# Patient Record
Sex: MALE | Race: WHITE | NOT HISPANIC OR LATINO | Employment: FULL TIME | URBAN - METROPOLITAN AREA
[De-identification: names, ages, dates, MRNs, and addresses within clinical notes are randomized per-mention and may not be internally consistent; named-entity substitution may affect disease eponyms.]

---

## 2017-04-17 ENCOUNTER — OFFICE VISIT (OUTPATIENT)
Dept: URGENT CARE | Facility: PHYSICIAN GROUP | Age: 32
End: 2017-04-17
Payer: OTHER GOVERNMENT

## 2017-04-17 VITALS
BODY MASS INDEX: 20.4 KG/M2 | DIASTOLIC BLOOD PRESSURE: 70 MMHG | HEIGHT: 67 IN | OXYGEN SATURATION: 98 % | TEMPERATURE: 97.4 F | WEIGHT: 130 LBS | SYSTOLIC BLOOD PRESSURE: 118 MMHG | RESPIRATION RATE: 14 BRPM | HEART RATE: 94 BPM

## 2017-04-17 DIAGNOSIS — M62.830 SPASM OF BACK MUSCLES: ICD-10-CM

## 2017-04-17 DIAGNOSIS — M54.50 ACUTE RIGHT-SIDED LOW BACK PAIN WITHOUT SCIATICA: ICD-10-CM

## 2017-04-17 PROCEDURE — 99214 OFFICE O/P EST MOD 30 MIN: CPT | Performed by: EMERGENCY MEDICINE

## 2017-04-17 RX ORDER — CYCLOBENZAPRINE HCL 5 MG
5-10 TABLET ORAL 3 TIMES DAILY PRN
Qty: 12 TAB | Refills: 0 | Status: SHIPPED | OUTPATIENT
Start: 2017-04-17

## 2017-04-17 RX ORDER — NAPROXEN 500 MG/1
500 TABLET ORAL 2 TIMES DAILY WITH MEALS
Qty: 10 TAB | Refills: 0 | Status: SHIPPED | OUTPATIENT
Start: 2017-04-17

## 2017-04-17 ASSESSMENT — ENCOUNTER SYMPTOMS
BOWEL INCONTINENCE: 0
PARESIS: 0
WEAKNESS: 0
BACK PAIN: 1
FEVER: 0
FOCAL WEAKNESS: 0
PARESTHESIAS: 0
LEG PAIN: 0
SENSORY CHANGE: 0
TINGLING: 0
NUMBNESS: 0
ABDOMINAL PAIN: 0

## 2017-04-17 ASSESSMENT — PAIN SCALES - GENERAL: PAINLEVEL: 4=SLIGHT-MODERATE PAIN

## 2017-04-17 NOTE — Clinical Note
April 17, 2017       Patient: Kavon Ayala   YOB: 1985   Date of Visit: 4/17/2017         To Whom It May Concern:    It is my medical opinion that Kavon Ayala should not have work activity involving bending, stooping, torso twisting, lifting more than 10 pounds, push/pull more than 10 pounds for 3 days.          Sincerely,          Margarito Cutler M.D.  Electronically Signed

## 2017-04-17 NOTE — PATIENT INSTRUCTIONS
You should contact a primary care provider for follow-up if not resolving in 5-7 days.     Muscle Cramps and Spasms  Muscle cramps and spasms are when muscles tighten by themselves. They usually get better within minutes. Muscle cramps are painful. They are usually stronger and last longer than muscle spasms. Muscle spasms may or may not be painful. They can last a few seconds or much longer.  HOME CARE  · Drink enough fluid to keep your pee (urine) clear or pale yellow.  · Massage, stretch, and relax the muscle.  · Use a warm towel, heating pad, or warm shower water on tight muscles.  · Place ice on the muscle if it is tender or in pain.  ¨ Put ice in a plastic bag.  ¨ Place a towel between your skin and the bag.  ¨ Leave the ice on for 15-20 minutes, 03-04 times a day.  · Only take medicine as told by your doctor.  GET HELP RIGHT AWAY IF:   Your cramps or spasms get worse, happen more often, or do not get better with time.  MAKE SURE YOU:  · Understand these instructions.  · Will watch your condition.  · Will get help right away if you are not doing well or get worse.     This information is not intended to replace advice given to you by your health care provider. Make sure you discuss any questions you have with your health care provider.     Document Released: 11/30/2009 Document Revised: 04/14/2014 Document Reviewed: 12/04/2013  [employer] Interactive Patient Education ©2016 [employer]  Back Injury Prevention  Back injuries can be very painful. They can also be difficult to heal. After having one back injury, you are more likely to injure your back again. It is important to learn how to avoid injuring or re-injuring your back. The following tips can help you to prevent a back injury.  WHAT SHOULD I KNOW ABOUT PHYSICAL FITNESS?  · Exercise for 30 minutes per day on most days of the week or as told by your doctor. Make sure to:  ¨ Do aerobic exercises, such as walking, jogging, biking, or swimming.  ¨ Do  "exercises that increase balance and strength, such as savanna chi and yoga.  ¨ Do stretching exercises. This helps with flexibility.  ¨ Try to develop strong belly (abdominal) muscles. Your belly muscles help to support your back.  · Stay at a healthy weight. This helps to decrease your risk of a back injury.  WHAT SHOULD I KNOW ABOUT MY DIET?  · Talk with your doctor about your overall diet. Take supplements and vitamins only as told by your doctor.  · Talk with your doctor about how much calcium and vitamin D you need each day. These nutrients help to prevent weakening of the bones (osteoporosis).  · Include good sources of calcium in your diet, such as:  · Dairy products.  · Green leafy vegetables.  · Products that have had calcium added to them (fortified).  · Include good sources of vitamin D in your diet, such as:  · Milk.  · Foods that have had vitamin D added to them.  WHAT SHOULD I KNOW ABOUT MY POSTURE?  · Sit up straight and stand up straight. Avoid leaning forward when you sit or hunching over when you stand.  · Choose chairs that have good low-back (lumbar) support.  · If you work at a desk, sit close to it so you do not need to lean over. Keep your chin tucked in. Keep your neck drawn back. Keep your elbows bent so your arms look like the letter \"L\" (right angle).  · Sit high and close to the steering wheel when you drive. Add a low-back support to your car seat, if needed.  · Avoid sitting or standing in one position for very long. Take breaks to get up, stretch, and walk around at least one time every hour. Take breaks every hour if you are driving for long periods of time.  · Sleep on your side with your knees slightly bent, or sleep on your back with a pillow under your knees. Do not lie on the front of your body to sleep.  WHAT SHOULD I KNOW ABOUT LIFTING, TWISTING, AND REACHING  Lifting and Heavy Lifting   · Avoid heavy lifting, especially lifting over and over again. If you must do heavy " lifting:  ¨ Stretch before lifting.  ¨ Work slowly.  ¨ Rest between lifts.  ¨ Use a tool such as a cart or a karli to move objects if one is available.  ¨ Make several small trips instead of carrying one heavy load.  ¨ Ask for help when you need it, especially when moving big objects.  · Follow these steps when lifting:  ¨ Stand with your feet shoulder-width apart.  ¨ Get as close to the object as you can. Do not  a heavy object that is far from your body.  ¨ Use handles or lifting straps if they are available.  ¨ Bend at your knees. Squat down, but keep your heels off the floor.  ¨ Keep your shoulders back. Keep your chin tucked in. Keep your back straight.  ¨ Lift the object slowly while you tighten the muscles in your legs, belly, and butt. Keep the object as close to the center of your body as possible.  · Follow these steps when putting down a heavy load:  ¨ Stand with your feet shoulder-width apart.  ¨ Lower the object slowly while you tighten the muscles in your legs, belly, and butt. Keep the object as close to the center of your body as possible.  ¨ Keep your shoulders back. Keep your chin tucked in. Keep your back straight.  ¨ Bend at your knees. Squat down, but keep your heels off the floor.  ¨ Use handles or lifting straps if they are available.  Twisting and Reaching   · Avoid lifting heavy objects above your waist.  · Do not twist at your waist while you are lifting or carrying a load. If you need to turn, move your feet.  · Do not bend over without bending at your knees.  · Avoid reaching over your head, across a table, or for an object on a high surface.    WHAT ARE SOME OTHER TIPS?  · Avoid wet floors and icy ground. Keep sidewalks clear of ice to prevent falls.    · Do not sleep on a mattress that is too soft or too hard.    · Keep items that you use often within easy reach.    · Put heavier objects on shelves at waist level, and put lighter objects on lower or higher shelves.  · Find ways  to lower your stress, such as:  ¨ Exercise.  ¨ Massage.  ¨ Relaxation techniques.  · Talk with your doctor if you feel anxious or depressed. These conditions can make back pain worse.  · Wear flat heel shoes with cushioned soles.  · Avoid making quick (sudden) movements.  · Use both shoulder straps when carrying a backpack.  · Do not use any tobacco products, including cigarettes, chewing tobacco, or electronic cigarettes. If you need help quitting, ask your doctor.     This information is not intended to replace advice given to you by your health care provider. Make sure you discuss any questions you have with your health care provider.     Document Released: 06/05/2009 Document Revised: 05/03/2016 Document Reviewed: 12/22/2015  [employer] Interactive Patient Education ©2016 [employer]  Back Pain, Adult  Back pain is very common. The pain often gets better over time. The cause of back pain is usually not dangerous. Most people can learn to manage their back pain on their own.   HOME CARE   Watch your back pain for any changes. The following actions may help to lessen any pain you are feeling:  · Stay active. Start with short walks on flat ground if you can. Try to walk farther each day.  · Exercise regularly as told by your doctor. Exercise helps your back heal faster. It also helps avoid future injury by keeping your muscles strong and flexible.  · Do not sit, drive, or  one place for more than 30 minutes.  · Do not stay in bed. Resting more than 1-2 days can slow down your recovery.  · Be careful when you bend or lift an object. Use good form when lifting:  ¨ Bend at your knees.  ¨ Keep the object close to your body.  ¨ Do not twist.  · Sleep on a firm mattress. Lie on your side, and bend your knees. If you lie on your back, put a pillow under your knees.  · Take medicines only as told by your doctor.  · Put ice on the injured area.  ¨ Put ice in a plastic bag.  ¨ Place a towel between your skin and the  bag.  ¨ Leave the ice on for 20 minutes, 2-3 times a day for the first 2-3 days. After that, you can switch between ice and heat packs.  · Avoid feeling anxious or stressed. Find good ways to deal with stress, such as exercise.  · Maintain a healthy weight. Extra weight puts stress on your back.  GET HELP IF:   · You have pain that does not go away with rest or medicine.  · You have worsening pain that goes down into your legs or buttocks.  · You have pain that does not get better in one week.  · You have pain at night.  · You lose weight.  · You have a fever or chills.  GET HELP RIGHT AWAY IF:   · You cannot control when you poop (bowel movement) or pee (urinate).  · Your arms or legs feel weak.  · Your arms or legs lose feeling (numbness).  · You feel sick to your stomach (nauseous) or throw up (vomit).  · You have belly (abdominal) pain.  · You feel like you may pass out (faint).     This information is not intended to replace advice given to you by your health care provider. Make sure you discuss any questions you have with your health care provider.     Document Released: 06/05/2009 Document Revised: 01/08/2016 Document Reviewed: 04/21/2015  [employer] Interactive Patient Education ©2016 [employer]

## 2017-04-17 NOTE — PROGRESS NOTES
"Subjective:      Kavon Ayala is a 32 y.o. male who presents with Pain            Back Pain  This is a new problem. Episode onset: 2 days. The problem occurs constantly. The problem has been gradually worsening since onset. The pain is present in the lumbar spine. The quality of the pain is described as aching. Radiates to: right buttock. The symptoms are aggravated by bending and twisting. Pertinent negatives include no abdominal pain, bladder incontinence, bowel incontinence, fever, leg pain, numbness, paresis, paresthesias, tingling or weakness. He has tried NSAIDs for the symptoms. The treatment provided mild relief.    no specific known trauma, but notes postural related issues with activity recently.    Review of Systems   Constitutional: Negative for fever.   Gastrointestinal: Negative for abdominal pain and bowel incontinence.   Genitourinary: Negative for bladder incontinence.   Musculoskeletal: Positive for back pain.   Neurological: Negative for tingling, sensory change, focal weakness, weakness, numbness and paresthesias.          Objective:     /70 mmHg  Pulse 94  Temp(Src) 36.3 °C (97.4 °F)  Resp 14  Ht 1.702 m (5' 7.01\")  Wt 58.968 kg (130 lb)  BMI 20.36 kg/m2  SpO2 98%     Physical Exam   Constitutional: Vital signs are normal. He appears well-developed and well-nourished. He is cooperative. He does not have a sickly appearance. He does not appear ill. No distress.   Cardiovascular:   Capillary refill is normal.   Musculoskeletal:        Lumbar back: He exhibits decreased range of motion, tenderness and spasm. He exhibits no bony tenderness, no swelling, no edema and no deformity.        Back:    Neurological: He is alert. He displays no tremor. Coordination normal.   Reflex Scores:       Patellar reflexes are 2+ on the right side and 2+ on the left side.       Achilles reflexes are 2+ on the right side and 2+ on the left side.  Distal sensation to light touch and pressure intact. "   Skin: Skin is warm and dry. No rash noted.   Psychiatric: He has a normal mood and affect.               Assessment/Plan:     1. Acute right-sided low back pain without sciatica  - naproxen (NAPROSYN) 500 MG Tab; Take 1 Tab by mouth 2 times a day, with meals.  Dispense: 10 Tab; Refill: 0    2. Spasm of back muscles  - cyclobenzaprine (FLEXERIL) 5 MG tablet; Take 1-2 Tabs by mouth 3 times a day as needed.  Dispense: 12 Tab; Refill: 0

## 2017-04-17 NOTE — MR AVS SNAPSHOT
"        Kavon Ayala   2017 10:45 AM   Office Visit   MRN: [id number]    Department:  [facility]   Dept Phone:  [phone]    Description:  Male : 1985   Provider:  Margarito Cutler M.D.           Reason for Visit     Pain lower right side back pain x 2 days      Allergies as of 2017     No Known Allergies      You were diagnosed with     Acute right-sided low back pain without sciatica   [2779465]       Spasm of back muscles   [[id number]]         Vital Signs     Blood Pressure Pulse Temperature Respirations Height Weight    118/70 mmHg 94 36.3 °C (97.4 °F) 14 1.702 m (5' 7.01\") 58.968 kg (130 lb)    Body Mass Index Oxygen Saturation Smoking Status             20.36 kg/m2 98% Never Smoker          Basic Information     Date Of Birth Sex Race Ethnicity Preferred Language    1985 Male White Unknown English      Health Maintenance        Date Due Completion Dates    IMM DTaP/Tdap/Td Vaccine (1 - Tdap) 2005 ---            Current Immunizations     No immunizations on file.      Below and/or attached are the medications your provider expects you to take. Review all of your home medications and newly ordered medications with your provider and/or pharmacist. Follow medication instructions as directed by your provider and/or pharmacist. Please keep your medication list with you and share with your provider. Update the information when medications are discontinued, doses are changed, or new medications (including over-the-counter products) are added; and carry medication information at all times in the event of emergency situations     Allergies:  No Known Allergies          Medications  Valid as of: 2017 - 11:23 AM    Generic Name Brand Name Tablet Size Instructions for use    Acyclovir (Tab) ZOVIRAX 800 MG Take 800 mg by mouth 2 times a day.        Amoxicillin (Cap) AMOXIL 500 MG Take 1 Cap by mouth 3 times a day.        Amphetamine-Dextroamphetamine (Tab) ADDERALL 20 MG Take 20 mg by " mouth 2 times a day.        Azithromycin (Tab) ZITHROMAX 250 MG Use YANA as directed        Cyclobenzaprine HCl (Tab) FLEXERIL 5 MG Take 1-2 Tabs by mouth 3 times a day as needed.        MethylPREDNISolone (Kit) MEDROL DOSEPACK 4 MG Take  by mouth. follow package directions        Naproxen (Tab) NAPROSYN 500 MG Take 1 Tab by mouth 2 times a day, with meals.        Triamcinolone Acetonide (Aerosol) NASACORT 55 MCG/ACT Hubbard 2 Sprays in nose every day.        .                 Medicines prescribed today were sent to:     [id number] - [place], [place] - [address]    [address] [place] [address]    Phone: [phone] Fax: [phone]    Open 24 Hours?: No      Medication refill instructions:       If your prescription bottle indicates you have medication refills left, it is not necessary to call your provider’s office. Please contact your pharmacy and they will refill your medication.    If your prescription bottle indicates you do not have any refills left, you may request refills at any time through one of the following ways: The online [employer] system (except Urgent Care), by calling your provider’s office, or by asking your pharmacy to contact your provider’s office with a refill request. Medication refills are processed only during regular business hours and may not be available until the next business day. Your provider may request additional information or to have a follow-up visit with you prior to refilling your medication.   *Please Note: Medication refills are assigned a new Rx number when refilled electronically. Your pharmacy may indicate that no refills were authorized even though a new prescription for the same medication is available at the pharmacy. Please request the medicine by name with the pharmacy before contacting your provider for a refill.        Instructions    You should contact a primary care provider for follow-up if not resolving in 5-7 days.     Muscle Cramps and  Spasms  Muscle cramps and spasms are when muscles tighten by themselves. They usually get better within minutes. Muscle cramps are painful. They are usually stronger and last longer than muscle spasms. Muscle spasms may or may not be painful. They can last a few seconds or much longer.  HOME CARE  · Drink enough fluid to keep your pee (urine) clear or pale yellow.  · Massage, stretch, and relax the muscle.  · Use a warm towel, heating pad, or warm shower water on tight muscles.  · Place ice on the muscle if it is tender or in pain.  ¨ Put ice in a plastic bag.  ¨ Place a towel between your skin and the bag.  ¨ Leave the ice on for 15-20 minutes, 03-04 times a day.  · Only take medicine as told by your doctor.  GET HELP RIGHT AWAY IF:   Your cramps or spasms get worse, happen more often, or do not get better with time.  MAKE SURE YOU:  · Understand these instructions.  · Will watch your condition.  · Will get help right away if you are not doing well or get worse.     This information is not intended to replace advice given to you by your health care provider. Make sure you discuss any questions you have with your health care provider.     Document Released: 11/30/2009 Document Revised: 04/14/2014 Document Reviewed: 12/04/2013  [employer] Interactive Patient Education ©2016 [employer]  Back Injury Prevention  Back injuries can be very painful. They can also be difficult to heal. After having one back injury, you are more likely to injure your back again. It is important to learn how to avoid injuring or re-injuring your back. The following tips can help you to prevent a back injury.  WHAT SHOULD I KNOW ABOUT PHYSICAL FITNESS?  · Exercise for 30 minutes per day on most days of the week or as told by your doctor. Make sure to:  ¨ Do aerobic exercises, such as walking, jogging, biking, or swimming.  ¨ Do exercises that increase balance and strength, such as savanna chi and yoga.  ¨ Do stretching exercises. This helps with  "flexibility.  ¨ Try to develop strong belly (abdominal) muscles. Your belly muscles help to support your back.  · Stay at a healthy weight. This helps to decrease your risk of a back injury.  WHAT SHOULD I KNOW ABOUT MY DIET?  · Talk with your doctor about your overall diet. Take supplements and vitamins only as told by your doctor.  · Talk with your doctor about how much calcium and vitamin D you need each day. These nutrients help to prevent weakening of the bones (osteoporosis).  · Include good sources of calcium in your diet, such as:  · Dairy products.  · Green leafy vegetables.  · Products that have had calcium added to them (fortified).  · Include good sources of vitamin D in your diet, such as:  · Milk.  · Foods that have had vitamin D added to them.  WHAT SHOULD I KNOW ABOUT MY POSTURE?  · Sit up straight and stand up straight. Avoid leaning forward when you sit or hunching over when you stand.  · Choose chairs that have good low-back (lumbar) support.  · If you work at a desk, sit close to it so you do not need to lean over. Keep your chin tucked in. Keep your neck drawn back. Keep your elbows bent so your arms look like the letter \"L\" (right angle).  · Sit high and close to the steering wheel when you drive. Add a low-back support to your car seat, if needed.  · Avoid sitting or standing in one position for very long. Take breaks to get up, stretch, and walk around at least one time every hour. Take breaks every hour if you are driving for long periods of time.  · Sleep on your side with your knees slightly bent, or sleep on your back with a pillow under your knees. Do not lie on the front of your body to sleep.  WHAT SHOULD I KNOW ABOUT LIFTING, TWISTING, AND REACHING  Lifting and Heavy Lifting   · Avoid heavy lifting, especially lifting over and over again. If you must do heavy lifting:  ¨ Stretch before lifting.  ¨ Work slowly.  ¨ Rest between lifts.  ¨ Use a tool such as a cart or a karli to move " objects if one is available.  ¨ Make several small trips instead of carrying one heavy load.  ¨ Ask for help when you need it, especially when moving big objects.  · Follow these steps when lifting:  ¨ Stand with your feet shoulder-width apart.  ¨ Get as close to the object as you can. Do not  a heavy object that is far from your body.  ¨ Use handles or lifting straps if they are available.  ¨ Bend at your knees. Squat down, but keep your heels off the floor.  ¨ Keep your shoulders back. Keep your chin tucked in. Keep your back straight.  ¨ Lift the object slowly while you tighten the muscles in your legs, belly, and butt. Keep the object as close to the center of your body as possible.  · Follow these steps when putting down a heavy load:  ¨ Stand with your feet shoulder-width apart.  ¨ Lower the object slowly while you tighten the muscles in your legs, belly, and butt. Keep the object as close to the center of your body as possible.  ¨ Keep your shoulders back. Keep your chin tucked in. Keep your back straight.  ¨ Bend at your knees. Squat down, but keep your heels off the floor.  ¨ Use handles or lifting straps if they are available.  Twisting and Reaching   · Avoid lifting heavy objects above your waist.  · Do not twist at your waist while you are lifting or carrying a load. If you need to turn, move your feet.  · Do not bend over without bending at your knees.  · Avoid reaching over your head, across a table, or for an object on a high surface.    WHAT ARE SOME OTHER TIPS?  · Avoid wet floors and icy ground. Keep sidewalks clear of ice to prevent falls.    · Do not sleep on a mattress that is too soft or too hard.    · Keep items that you use often within easy reach.    · Put heavier objects on shelves at waist level, and put lighter objects on lower or higher shelves.  · Find ways to lower your stress, such as:  ¨ Exercise.  ¨ Massage.  ¨ Relaxation techniques.  · Talk with your doctor if you feel  anxious or depressed. These conditions can make back pain worse.  · Wear flat heel shoes with cushioned soles.  · Avoid making quick (sudden) movements.  · Use both shoulder straps when carrying a backpack.  · Do not use any tobacco products, including cigarettes, chewing tobacco, or electronic cigarettes. If you need help quitting, ask your doctor.     This information is not intended to replace advice given to you by your health care provider. Make sure you discuss any questions you have with your health care provider.     Document Released: 06/05/2009 Document Revised: 05/03/2016 Document Reviewed: 12/22/2015  [employer] Interactive Patient Education ©2016 [employer] Inc.  Back Pain, Adult  Back pain is very common. The pain often gets better over time. The cause of back pain is usually not dangerous. Most people can learn to manage their back pain on their own.   HOME CARE   Watch your back pain for any changes. The following actions may help to lessen any pain you are feeling:  · Stay active. Start with short walks on flat ground if you can. Try to walk farther each day.  · Exercise regularly as told by your doctor. Exercise helps your back heal faster. It also helps avoid future injury by keeping your muscles strong and flexible.  · Do not sit, drive, or  one place for more than 30 minutes.  · Do not stay in bed. Resting more than 1-2 days can slow down your recovery.  · Be careful when you bend or lift an object. Use good form when lifting:  ¨ Bend at your knees.  ¨ Keep the object close to your body.  ¨ Do not twist.  · Sleep on a firm mattress. Lie on your side, and bend your knees. If you lie on your back, put a pillow under your knees.  · Take medicines only as told by your doctor.  · Put ice on the injured area.  ¨ Put ice in a plastic bag.  ¨ Place a towel between your skin and the bag.  ¨ Leave the ice on for 20 minutes, 2-3 times a day for the first 2-3 days. After that, you can switch between ice  and heat packs.  · Avoid feeling anxious or stressed. Find good ways to deal with stress, such as exercise.  · Maintain a healthy weight. Extra weight puts stress on your back.  GET HELP IF:   · You have pain that does not go away with rest or medicine.  · You have worsening pain that goes down into your legs or buttocks.  · You have pain that does not get better in one week.  · You have pain at night.  · You lose weight.  · You have a fever or chills.  GET HELP RIGHT AWAY IF:   · You cannot control when you poop (bowel movement) or pee (urinate).  · Your arms or legs feel weak.  · Your arms or legs lose feeling (numbness).  · You feel sick to your stomach (nauseous) or throw up (vomit).  · You have belly (abdominal) pain.  · You feel like you may pass out (faint).     This information is not intended to replace advice given to you by your health care provider. Make sure you discuss any questions you have with your health care provider.     Document Released: 06/05/2009 Document Revised: 01/08/2016 Document Reviewed: 04/21/2015  [employer] Interactive Patient Education ©2016 Elsevier Inc.            [employer] Access Code: [email or web address]  Expires: 5/17/2017 11:23 AM    Your email address is not on file at [facility].  Email Addresses are required for you to sign up for [employer], please contact [phone] to verify your personal information and to provide your email address prior to attempting to register for [employer].    Kavon Ayala  [address] [place] [address] [place] [address]    [employer]  A secure, online tool to manage your health information     [facility]’s [employer]® is a secure, online tool that connects you to your personalized health information from the privacy of your home -- day or night - making it very easy for you to manage your healthcare. Once the activation process is completed, you can even access your medical information using the [employer] loyda, which is available for free in the Apple Loyda store or  Google Play store.     To learn more about [facility], visit [email or web address]    There are two levels of access available (as shown below):   My Chart Features  [facility] Doctor [facility]  Specialists [facility]  Urgent  Care Non-[facility] Primary Care Doctor   Email your healthcare team securely and privately 24/7 X X X    Manage appointments: schedule your next appointment; view details of past/upcoming appointments X      Request prescription refills. X      View recent personal medical records, including lab and immunizations X X X X   View health record, including health history, allergies, medications X X X X   Read reports about your outpatient visits, procedures, consult and ER notes X X X X   See your discharge summary, which is a recap of your hospital and/or ER visit that includes your diagnosis, lab results, and care plan X X  X     How to register for [facility]:  Once your e-mail address has been verified, follow the following steps to sign up for [facility].     1. Go to  [email or web address]  2. Click on the Sign Up Now box, which takes you to the New Member Sign Up page. You will need to provide the following information:  a. Enter your [facility] Access Code exactly as it appears at the top of this page. (You will not need to use this code after you’ve completed the sign-up process. If you do not sign up before the expiration date, you must request a new code.)   b. Enter your date of birth.   c. Enter your home email address.   d. Click Submit, and follow the next screen’s instructions.  3. Create a [facility] ID. This will be your [facility] login ID and cannot be changed, so think of one that is secure and easy to remember.  4. Create a [facility] password. You can change your password at any time.  5. Enter your Password Reset Question and Answer. This can be used at a later time if you forget your password.   6. Enter your e-mail address. This allows you to receive e-mail notifications when new  information is available in [facility].  7. Click Sign Up. You can now view your health information.    For assistance activating your [facility] account, call [phone]

## 2018-09-03 ENCOUNTER — OFFICE VISIT (OUTPATIENT)
Dept: URGENT CARE | Facility: PHYSICIAN GROUP | Age: 33
End: 2018-09-03
Payer: COMMERCIAL

## 2018-09-03 VITALS
HEIGHT: 67 IN | BODY MASS INDEX: 19.62 KG/M2 | SYSTOLIC BLOOD PRESSURE: 100 MMHG | DIASTOLIC BLOOD PRESSURE: 70 MMHG | TEMPERATURE: 98.5 F | WEIGHT: 125 LBS | RESPIRATION RATE: 14 BRPM

## 2018-09-03 DIAGNOSIS — J45.21 MILD INTERMITTENT ASTHMA WITH EXACERBATION: ICD-10-CM

## 2018-09-03 DIAGNOSIS — R05.9 COUGH: ICD-10-CM

## 2018-09-03 DIAGNOSIS — J01.00 ACUTE MAXILLARY SINUSITIS, RECURRENCE NOT SPECIFIED: ICD-10-CM

## 2018-09-03 PROCEDURE — 99214 OFFICE O/P EST MOD 30 MIN: CPT | Performed by: PHYSICIAN ASSISTANT

## 2018-09-03 RX ORDER — FLUTICASONE PROPIONATE 50 MCG
1 SPRAY, SUSPENSION (ML) NASAL DAILY
Qty: 16 G | Refills: 0 | Status: SHIPPED | OUTPATIENT
Start: 2018-09-03

## 2018-09-03 RX ORDER — ALBUTEROL SULFATE 90 UG/1
2 AEROSOL, METERED RESPIRATORY (INHALATION) EVERY 6 HOURS PRN
Qty: 8.5 G | Refills: 2 | Status: SHIPPED | OUTPATIENT
Start: 2018-09-03

## 2018-09-03 RX ORDER — AMOXICILLIN AND CLAVULANATE POTASSIUM 875; 125 MG/1; MG/1
1 TABLET, FILM COATED ORAL 2 TIMES DAILY
Qty: 20 TAB | Refills: 0 | Status: SHIPPED | OUTPATIENT
Start: 2018-09-03 | End: 2024-03-07

## 2018-09-03 ASSESSMENT — ENCOUNTER SYMPTOMS
SINUS PAIN: 1
FEVER: 0
COUGH: 1
DIAPHORESIS: 0
ABDOMINAL PAIN: 0
SWOLLEN GLANDS: 1
CHILLS: 0
EYE DISCHARGE: 0
HEADACHES: 1
SPUTUM PRODUCTION: 0
SINUS PRESSURE: 1
WHEEZING: 1
VOMITING: 0
SHORTNESS OF BREATH: 0
DIARRHEA: 0
EYE REDNESS: 0
NAUSEA: 0
SORE THROAT: 1

## 2018-09-03 ASSESSMENT — PAIN SCALES - GENERAL: PAINLEVEL: NO PAIN

## 2018-09-03 NOTE — PROGRESS NOTES
"Subjective:   Kavon Ayala is a 33 y.o. male who presents for Sinusitis (x 5 days)        Sinusitis   This is a new problem. The current episode started in the past 7 days. The problem has been gradually worsening since onset. There has been no fever. Associated symptoms include congestion, coughing, headaches, sinus pressure, a sore throat and swollen glands. Pertinent negatives include no chills, diaphoresis, ear pain, shortness of breath or sneezing. Past treatments include saline sprays. The treatment provided mild relief.   notes last 5d of sinus congestion, prod/wheeze cough, PMH of bronchitis and remote PMH of pneumonia.     Review of Systems   Constitutional: Negative for chills, diaphoresis and fever.   HENT: Positive for congestion, sinus pain, sinus pressure and sore throat. Negative for ear pain and sneezing.    Eyes: Negative for discharge and redness.   Respiratory: Positive for cough and wheezing. Negative for sputum production and shortness of breath.    Cardiovascular: Negative for chest pain and leg swelling.   Gastrointestinal: Negative for abdominal pain, diarrhea, nausea and vomiting.   Skin: Negative for rash.   Neurological: Positive for headaches.   Endo/Heme/Allergies: Positive for environmental allergies.     No Known Allergies   I have worn a mask for the entire encounter with this patient.    Objective:   /70   Temp 36.9 °C (98.5 °F)   Resp 14   Ht 1.702 m (5' 7\")   Wt 56.7 kg (125 lb)   BMI 19.58 kg/m²   Physical Exam   Constitutional: He is oriented to person, place, and time. He appears well-developed and well-nourished. No distress.   HENT:   Head: Normocephalic and atraumatic.   Right Ear: External ear and ear canal normal. Tympanic membrane is bulging. Tympanic membrane is not erythematous.   Left Ear: External ear and ear canal normal. Tympanic membrane is bulging. Tympanic membrane is not erythematous.   Nose: Right sinus exhibits maxillary sinus tenderness. Right " sinus exhibits no frontal sinus tenderness. Left sinus exhibits maxillary sinus tenderness. Left sinus exhibits no frontal sinus tenderness.   Mouth/Throat: Uvula is midline and mucous membranes are normal. Posterior oropharyngeal erythema ( PND) present. No oropharyngeal exudate, posterior oropharyngeal edema or tonsillar abscesses.   Eyes: Conjunctivae are normal. Right eye exhibits no discharge. Left eye exhibits no discharge. No scleral icterus.   Neck: Neck supple.   Pulmonary/Chest: Effort normal. No respiratory distress. He has no decreased breath sounds. He has no wheezes. He has no rhonchi. He has no rales.   Musculoskeletal: Normal range of motion.   Lymphadenopathy:     He has cervical adenopathy ( mild bilat).   Neurological: He is alert and oriented to person, place, and time. Coordination normal.   Skin: Skin is warm and dry. He is not diaphoretic. No pallor.   Psychiatric: He has a normal mood and affect.   Nursing note and vitals reviewed.        Assessment/Plan:   Assessment    1. Acute maxillary sinusitis, recurrence not specified  - amoxicillin-clavulanate (AUGMENTIN) 875-125 MG Tab; Take 1 Tab by mouth 2 times a day.  Dispense: 20 Tab; Refill: 0    2. Cough    3. Mild intermittent asthma with exacerbation  - albuterol 108 (90 Base) MCG/ACT Aero Soln inhalation aerosol; Inhale 2 Puffs by mouth every 6 hours as needed for Shortness of Breath.  Dispense: 8.5 g; Refill: 2  - fluticasone (FLONASE) 50 MCG/ACT nasal spray; Spray 1 Spray in nose every day.  Dispense: 16 g; Refill: 0  Supportive care is reviewed with patient/caregiver - recommend to push PO fluids and electrolytes, Nsaids/tylenol, netti pot/saline irrig, humidifier in home, flonase, ponaris, antihistamine,  take full course of Rx, take with probiotics, observe for resolution  Return to clinic with lack of resolution or progression of symptoms.      Differential diagnosis, natural history, supportive care, and indications for immediate  follow-up discussed.

## 2019-04-16 ENCOUNTER — OFFICE VISIT (OUTPATIENT)
Dept: URGENT CARE | Facility: CLINIC | Age: 34
End: 2019-04-16
Payer: COMMERCIAL

## 2019-04-16 VITALS
WEIGHT: 123 LBS | HEART RATE: 105 BPM | RESPIRATION RATE: 12 BRPM | SYSTOLIC BLOOD PRESSURE: 120 MMHG | HEIGHT: 67 IN | DIASTOLIC BLOOD PRESSURE: 90 MMHG | BODY MASS INDEX: 19.3 KG/M2 | TEMPERATURE: 98.1 F | OXYGEN SATURATION: 97 %

## 2019-04-16 DIAGNOSIS — J01.40 ACUTE PANSINUSITIS, RECURRENCE NOT SPECIFIED: ICD-10-CM

## 2019-04-16 DIAGNOSIS — B02.9 HERPES ZOSTER WITHOUT COMPLICATION: ICD-10-CM

## 2019-04-16 DIAGNOSIS — J22 LRTI (LOWER RESPIRATORY TRACT INFECTION): ICD-10-CM

## 2019-04-16 DIAGNOSIS — N35.811 OTHER URETHRAL STRICTURE, MALE, MEATAL: ICD-10-CM

## 2019-04-16 DIAGNOSIS — B86 SCABIES: ICD-10-CM

## 2019-04-16 DIAGNOSIS — Z72.0 TOBACCO USE: ICD-10-CM

## 2019-04-16 DIAGNOSIS — J98.01 BRONCHOSPASM: ICD-10-CM

## 2019-04-16 LAB
APPEARANCE UR: CLEAR
BILIRUB UR STRIP-MCNC: NORMAL MG/DL
COLOR UR AUTO: YELLOW
GLUCOSE UR STRIP.AUTO-MCNC: NORMAL MG/DL
KETONES UR STRIP.AUTO-MCNC: NORMAL MG/DL
LEUKOCYTE ESTERASE UR QL STRIP.AUTO: NORMAL
NITRITE UR QL STRIP.AUTO: NORMAL
PH UR STRIP.AUTO: 7 [PH] (ref 5–8)
PROT UR QL STRIP: NORMAL MG/DL
RBC UR QL AUTO: NORMAL
SP GR UR STRIP.AUTO: 1.02
UROBILINOGEN UR STRIP-MCNC: 0.2 MG/DL

## 2019-04-16 PROCEDURE — 81002 URINALYSIS NONAUTO W/O SCOPE: CPT | Performed by: PHYSICIAN ASSISTANT

## 2019-04-16 PROCEDURE — 99214 OFFICE O/P EST MOD 30 MIN: CPT | Performed by: PHYSICIAN ASSISTANT

## 2019-04-16 RX ORDER — DOXYCYCLINE HYCLATE 100 MG
100 TABLET ORAL 2 TIMES DAILY
Qty: 14 TAB | Refills: 0 | Status: SHIPPED | OUTPATIENT
Start: 2019-04-16 | End: 2019-04-23

## 2019-04-16 RX ORDER — PERMETHRIN 50 MG/G
CREAM TOPICAL
Qty: 30 G | Refills: 0 | Status: SHIPPED | OUTPATIENT
Start: 2019-04-16

## 2019-04-16 RX ORDER — ALBUTEROL SULFATE 90 UG/1
2 AEROSOL, METERED RESPIRATORY (INHALATION) EVERY 6 HOURS PRN
Qty: 8.5 G | Refills: 0 | Status: SHIPPED | OUTPATIENT
Start: 2019-04-16

## 2019-04-16 RX ORDER — BENZONATATE 100 MG/1
200 CAPSULE ORAL 3 TIMES DAILY PRN
Qty: 60 CAP | Refills: 0 | Status: SHIPPED | OUTPATIENT
Start: 2019-04-16

## 2019-04-16 RX ORDER — VALACYCLOVIR HYDROCHLORIDE 1 G/1
1000 TABLET, FILM COATED ORAL 2 TIMES DAILY
Qty: 20 TAB | Refills: 0 | Status: SHIPPED | OUTPATIENT
Start: 2019-04-16 | End: 2019-04-16

## 2019-04-16 RX ORDER — VALACYCLOVIR HYDROCHLORIDE 1 G/1
1000 TABLET, FILM COATED ORAL 3 TIMES DAILY
Qty: 21 TAB | Refills: 0 | Status: SHIPPED | OUTPATIENT
Start: 2019-04-16 | End: 2019-04-23

## 2019-04-16 ASSESSMENT — ENCOUNTER SYMPTOMS
PHOTOPHOBIA: 0
FEVER: 1
HEMOPTYSIS: 0
EYE REDNESS: 0
EYE DISCHARGE: 0
FLANK PAIN: 0
NAUSEA: 0
SINUS PAIN: 1
CHILLS: 0
SHORTNESS OF BREATH: 0
ABDOMINAL PAIN: 0
EYE PAIN: 0
DOUBLE VISION: 0
VOMITING: 0
BLURRED VISION: 0
COUGH: 1
DIARRHEA: 0
WHEEZING: 1
SPUTUM PRODUCTION: 1
HEADACHES: 1

## 2019-04-17 NOTE — PATIENT INSTRUCTIONS
Scabies, Adult  Introduction  Scabies is a skin condition that happens when very small insects get under the skin (infestation). This causes a rash and severe itchiness. Scabies can spread from person to person (is contagious). If you get scabies, it is common for others in your household to get scabies too.  With proper treatment, symptoms usually go away in 2-4 weeks. Scabies usually does not cause lasting problems.  What are the causes?  This condition is caused by mites (Sarcoptes scabiei, or human itch mites) that can only be seen with a microscope. The mites get into the top layer of skin and lay eggs. Scabies can spread from person to person through:  · Close contact with a person who has scabies.  · Contact with infested items, such as towels, bedding, or clothing.  What increases the risk?  This condition is more likely to develop in:  · People who live in nursing homes and other extended-care facilities.  · People who have sexual contact with a partner who has scabies.  · Young children who attend  facilities.  · People who care for others who are at increased risk for scabies.  What are the signs or symptoms?  Symptoms of this condition may include:  · Severe itchiness. This is often worse at night.  · A rash that includes tiny red bumps or blisters. The rash commonly occurs on the wrist, elbow, armpit, fingers, waist, groin, or buttocks. Bumps may form a line (burrow) in some areas.  · Skin irritation. This can include scaly patches or sores.  How is this diagnosed?  This condition is diagnosed with a physical exam. Your health care provider will look closely at your skin. In some cases, your health care provider may take a sample of your affected skin (skin scraping) and have it examined under a microscope.  How is this treated?  This condition may be treated with:  · Medicated cream or lotion that kills the mites. This is spread on the entire body and left on for several hours. Usually, one  treatment with medicated cream or lotion is enough to kill all of the mites. In severe cases, the treatment may be repeated.  · Medicated cream that relieves itching.  · Medicines that help to relieve itching.  · Medicines that kill the mites. This treatment is rarely used.  Follow these instructions at home:     Medicines  · Take or apply over-the-counter and prescription medicines as told by your health care provider.  · Apply medicated cream or lotion as told by your health care provider.  · Do not wash off the medicated cream or lotion until the necessary amount of time has passed.  Skin Care  · Avoid scratching your affected skin.  · Keep your fingernails closely trimmed to reduce injury from scratching.  · Take cool baths or apply cool washcloths to help reduce itching.  General instructions  · Clean all items that you recently had contact with, including bedding, clothing, and furniture. Do this on the same day that your treatment starts.  ¨ Use hot water when you wash items.  ¨ Place unwashable items into closed, airtight plastic bags for at least 3 days. The mites cannot live for more than 3 days away from human skin.  ¨ Vacuum furniture and mattresses that you use.  · Make sure that other people who may have been infested are examined by a health care provider. These include members of your household and anyone who may have had contact with infested items.  · Keep all follow-up visits as told by your health care provider. This is important.  Contact a health care provider if:  · You have itching that does not go away after 4 weeks of treatment.  · You continue to develop new bumps or burrows.  · You have redness, swelling, or pain in your rash area after treatment.  · You have fluid, blood, or pus coming from your rash.  This information is not intended to replace advice given to you by your health care provider. Make sure you discuss any questions you have with your health care provider.  Document  Released: 09/07/2016 Document Revised: 05/25/2017 Document Reviewed: 07/19/2016  © 2017 [employer]  Shingles  Shingles is an infection that causes a painful skin rash and fluid-filled blisters. Shingles is caused by the same virus that causes chickenpox.  Shingles only develops in people who:  · Have had chickenpox.  · Have gotten the chickenpox vaccine. (This is rare.)  The first symptoms of shingles may be itching, tingling, or pain in an area on your skin. A rash will follow in a few days or weeks. The rash is usually on one side of the body in a bandlike or beltlike pattern. Over time, the rash turns into fluid-filled blisters that break open, scab over, and dry up. Medicines may:  · Help you manage pain.  · Help you recover more quickly.  · Help to prevent long-term problems.  Follow these instructions at home:  Medicines  · Take medicines only as told by your doctor.  · Apply an anti-itch or numbing cream to the affected area as told by your doctor.  Blister and Rash Care  · Take a cool bath or put cool compresses on the area of the rash or blisters as told by your doctor. This may help with pain and itching.  · Keep your rash covered with a loose bandage (dressing). Wear loose-fitting clothing.  · Keep your rash and blisters clean with mild soap and cool water or as told by your doctor.  · Check your rash every day for signs of infection. These include redness, swelling, and pain that lasts or gets worse.  · Do not pick your blisters.  · Do not scratch your rash.  General instructions  · Rest as told by your doctor.  · Keep all follow-up visits as told by your doctor. This is important.  · Until your blisters scab over, your infection can cause chickenpox in people who have never had it or been vaccinated against it. To prevent this from happening, avoid touching other people or being around other people, especially:  ¨ Babies.  ¨ Pregnant women.  ¨ Children who have eczema.  ¨ Elderly people who have  transplants.  ¨ People who have chronic illnesses, such as leukemia or AIDS.  Contact a doctor if:  · Your pain does not get better with medicine.  · Your pain does not get better after the rash heals.  · Your rash looks infected. Signs of infection include:  ¨ Redness.  ¨ Swelling.  ¨ Pain that lasts or gets worse.  Get help right away if:  · The rash is on your face or nose.  · You have pain in your face, pain around your eye area, or loss of feeling on one side of your face.  · You have ear pain or you have ringing in your ear.  · You have loss of taste.  · Your condition gets worse.  This information is not intended to replace advice given to you by your health care provider. Make sure you discuss any questions you have with your health care provider.  Document Released: 06/05/2009 Document Revised: 08/13/2017 Document Reviewed: 09/29/2015  [employer] Interactive Patient Education © 2017 [employer]

## 2019-04-17 NOTE — PROGRESS NOTES
Subjective:   Kavon Ayala is a 34 y.o. male who presents for Rash (X 2 weeks rashes on face,legs, nose,coughing, diff urinatimg)    This is a new problem.  Patient presents to urgent care with several issues of concern.    1.  Rash to right side of forehead: Patient reports painful rash that burns on the right side of the forehead that developed yesterday.  Patient also reports similar lesions on the right side of the nose and right upper lip.  Patient does admit that he has been under a great deal of stress lately.  Patient denies pain in the right eye, blurred vision or ordering of the right eye.    2.  Itchy rash over entire body: Patient reports a very itchy rash over his entire body for several weeks to months.  Denies new soaps, detergents or medications.  Has tried multiple over-the-counter medications with no improvement in symptoms.    3.  Difficulty urinating: Patient reports that for the past several weeks he has had difficulty urinating.  He states that he feels that he cannot void without straining and once he does he feels that he does not completely empty his bladder.  He denies dysuria, urgency or frequency.  Denies hematuria.    4.  Cough: Patient reports nasal congestion with cough for the past 2 weeks.  He reports thick yellow-green nasal drainage is thick yellow green sputum.  He also reports that he feels that he has been wheezing.  Patient denies prior history of asthma.  Patient is a current smoker.      Rash   Associated symptoms include congestion, coughing and a fever. Pertinent negatives include no diarrhea, eye pain, shortness of breath or vomiting.     Review of Systems   Constitutional: Positive for fever and malaise/fatigue. Negative for chills.   HENT: Positive for congestion and sinus pain. Negative for ear pain.    Eyes: Negative for blurred vision, double vision, photophobia, pain, discharge and redness.   Respiratory: Positive for cough, sputum production and wheezing.  "Negative for hemoptysis and shortness of breath.    Cardiovascular: Negative for chest pain.   Gastrointestinal: Negative for abdominal pain, diarrhea, nausea and vomiting.   Genitourinary: Negative for dysuria, flank pain, frequency, hematuria and urgency.        Difficulty urinating, incomplete bladder emptying   Skin: Positive for itching and rash.   Neurological: Positive for headaches.   All other systems reviewed and are negative.    No Known Allergies     Objective:   /90 (BP Location: Left arm, Patient Position: Sitting, BP Cuff Size: Adult)   Pulse (!) 105   Temp 36.7 °C (98.1 °F) (Temporal)   Resp 12   Ht 1.702 m (5' 7\")   Wt 55.8 kg (123 lb)   SpO2 97%   BMI 19.26 kg/m²   Physical Exam   Constitutional: He is oriented to person, place, and time. He appears well-developed and well-nourished. He does not appear ill. No distress.   HENT:   Head: Normocephalic and atraumatic.   Right Ear: Tympanic membrane, external ear and ear canal normal.   Left Ear: Tympanic membrane, external ear and ear canal normal.   Nose: Mucosal edema present. Right sinus exhibits maxillary sinus tenderness and frontal sinus tenderness. Left sinus exhibits maxillary sinus tenderness and frontal sinus tenderness.   Mouth/Throat: Uvula is midline and mucous membranes are normal. Posterior oropharyngeal erythema present. No oropharyngeal exudate. Tonsils are 1+ on the right. Tonsils are 1+ on the left.   Purulent postnasal drip noted with cobblestoning of the posterior pharynx   Eyes: Pupils are equal, round, and reactive to light. Conjunctivae, EOM and lids are normal.   Slit lamp exam:       The right eye shows no corneal abrasion, no fluorescein uptake and no anterior chamber bulge.   No dendritic lesions noted   Neck: Normal range of motion. Neck supple.   Cardiovascular: Normal rate, regular rhythm and normal heart sounds.  Exam reveals no friction rub.    No murmur heard.  Pulmonary/Chest: Effort normal. No " respiratory distress. He has wheezes. He has rhonchi.   scattered rhonchi throughout with inspiratory and expiratory wheezes throughout.  No rales noted.  Breath sounds equal and unlabored.   Abdominal: Soft. Bowel sounds are normal. There is no hepatosplenomegaly. There is no tenderness.   Genitourinary: Testes normal. Right testis shows no mass, no swelling and no tenderness. Left testis shows no mass, no swelling and no tenderness. Circumcised. No discharge found.   Genitourinary Comments: Urethral meatus narrowed with what appears to be stricture   Musculoskeletal: Normal range of motion.   Lymphadenopathy:        Head (right side): No submental, no submandibular and no tonsillar adenopathy present.        Head (left side): No submental, no submandibular and no tonsillar adenopathy present.     He has no cervical adenopathy.        Right: No supraclavicular adenopathy present.        Left: No supraclavicular adenopathy present.   Neurological: He is alert and oriented to person, place, and time. He has normal strength. No cranial nerve deficit or sensory deficit. Coordination normal.   Skin: Skin is warm and dry. No rash noted.        Right side of face that area is marked with patches of vesicular lesions  Trunk, arms and legs with maculopapular rash in linear distribution with excoriations   Psychiatric: He has a normal mood and affect. Judgment normal.   Vitals reviewed.          Assessment/Plan:   Assessment    1. Herpes zoster without complication  - valacyclovir (VALTREX) 1 GM Tab; Take 1 Tab by mouth 2 times a day.  Dispense: 20 Tab; Refill: 0    Patient will be treated with Valtrex as above.  Patient is counseled on returning immediately for eye pain or redness.  Printed information with patient education on zoster given.    2. Scabies  - permethrin (ELIMITE) 5 % Cream; Apply as directed, wash off after 8-14 hours  Dispense: 30 g; Refill: 0  Patient will be treated with permethrin as above.  Printed  information with patient education on scabies provided.  Contagion reviewed.        3. Other urethral stricture, male, meatal  - POCT Urinalysis  - REFERRAL TO UROLOGY    Patient was unable to provide urine specimen here in the clinic.  Referral was placed to urology.    4. LRTI (lower respiratory tract infection)  - doxycycline (VIBRAMYCIN) 100 MG Tab; Take 1 Tab by mouth 2 times a day for 7 days.  Dispense: 14 Tab; Refill: 0  - benzonatate (TESSALON) 100 MG Cap; Take 2 Caps by mouth 3 times a day as needed.  Dispense: 60 Cap; Refill: 0    5. Bronchospasm  - albuterol 108 (90 Base) MCG/ACT Aero Soln inhalation aerosol; Inhale 2 Puffs by mouth every 6 hours as needed for Shortness of Breath.  Dispense: 8.5 g; Refill: 0    6. Acute pansinusitis, recurrence not specified    7. Tobacco use    Patient will be treated with doxycycline for lower respiratory tract infection and sinusitis.  He is counseled on tobacco cessation, see counseling above.  He is also given Tessalon Perles as needed for cough and albuterol for wheezing.  Recommend addition of Mucinex to her regimen.  Also recommend addition of nasal saline rinse and over-the-counter Flonase nasal spray to her regimen.    Differential diagnosis, natural history, supportive care, and indications for immediate follow-up discussed.    If not improving in 3-5 days, F/U with PCP or return to  or sooner if worsens    Red flag warning symptoms and strict ER/follow-up precautions given.    Please note that this note was created using voice recognition speech to text software. Every effort has been made to correct obvious errors.  However, I expect there are errors of grammar and possibly context that were not discovered prior to finalizing the note    SHAHRZAD Samuel PA-C

## 2024-03-07 ENCOUNTER — APPOINTMENT (OUTPATIENT)
Dept: URGENT CARE | Facility: CLINIC | Age: 39
End: 2024-03-07
Payer: COMMERCIAL

## 2024-03-07 ENCOUNTER — OFFICE VISIT (OUTPATIENT)
Dept: URGENT CARE | Facility: CLINIC | Age: 39
End: 2024-03-07
Payer: COMMERCIAL

## 2024-03-07 VITALS
WEIGHT: 140 LBS | DIASTOLIC BLOOD PRESSURE: 60 MMHG | HEART RATE: 70 BPM | SYSTOLIC BLOOD PRESSURE: 102 MMHG | BODY MASS INDEX: 21.97 KG/M2 | OXYGEN SATURATION: 97 % | RESPIRATION RATE: 16 BRPM | TEMPERATURE: 98.4 F | HEIGHT: 67 IN

## 2024-03-07 DIAGNOSIS — J98.01 ACUTE BRONCHOSPASM: ICD-10-CM

## 2024-03-07 DIAGNOSIS — J01.90 ACUTE SINUSITIS, RECURRENCE NOT SPECIFIED, UNSPECIFIED LOCATION: Primary | ICD-10-CM

## 2024-03-07 DIAGNOSIS — R52 GENERALIZED BODY ACHES: ICD-10-CM

## 2024-03-07 DIAGNOSIS — J45.21 MILD INTERMITTENT ASTHMA WITH ACUTE EXACERBATION: ICD-10-CM

## 2024-03-07 DIAGNOSIS — R05.9 COUGH, UNSPECIFIED TYPE: ICD-10-CM

## 2024-03-07 PROCEDURE — 3074F SYST BP LT 130 MM HG: CPT | Performed by: PHYSICIAN ASSISTANT

## 2024-03-07 PROCEDURE — 3078F DIAST BP <80 MM HG: CPT | Performed by: PHYSICIAN ASSISTANT

## 2024-03-07 PROCEDURE — 99204 OFFICE O/P NEW MOD 45 MIN: CPT | Performed by: PHYSICIAN ASSISTANT

## 2024-03-07 RX ORDER — PREDNISONE 20 MG/1
20 TABLET ORAL 2 TIMES DAILY
Qty: 10 TABLET | Refills: 0 | Status: SHIPPED | OUTPATIENT
Start: 2024-03-07 | End: 2024-03-12

## 2024-03-07 RX ORDER — ALBUTEROL SULFATE 90 UG/1
2 AEROSOL, METERED RESPIRATORY (INHALATION) EVERY 6 HOURS PRN
Qty: 8.5 G | Refills: 0 | Status: SHIPPED | OUTPATIENT
Start: 2024-03-07

## 2024-03-07 RX ORDER — DEXTROMETHORPHAN HYDROBROMIDE AND PROMETHAZINE HYDROCHLORIDE 15; 6.25 MG/5ML; MG/5ML
5 SYRUP ORAL EVERY 4 HOURS PRN
Qty: 180 ML | Refills: 0 | Status: SHIPPED | OUTPATIENT
Start: 2024-03-07 | End: 2024-03-17

## 2024-03-07 RX ORDER — AMOXICILLIN AND CLAVULANATE POTASSIUM 875; 125 MG/1; MG/1
1 TABLET, FILM COATED ORAL 2 TIMES DAILY
Qty: 14 TABLET | Refills: 0 | Status: SHIPPED | OUTPATIENT
Start: 2024-03-07 | End: 2024-03-14

## 2024-03-07 ASSESSMENT — ENCOUNTER SYMPTOMS
COUGH: 1
SINUS PAIN: 1
SHORTNESS OF BREATH: 1
SPUTUM PRODUCTION: 1
HEADACHES: 1
FEVER: 1
SORE THROAT: 1
SINUS PRESSURE: 1
WHEEZING: 1

## 2024-03-07 NOTE — PROGRESS NOTES
"Subjective     Kavon Ayala is a 38 y.o. male who presents with Cough (Chest congestion x 6 days, coughing up yellow mucus ), Body Aches, Leg Pain (Calf cramps ), Back Pain, and Nasal Congestion (Green/ yellow mucus )            Patient with history of mild intermittent asthma presents to clinic today with complaint of wheezy cough that is also productive, sinus pain and pressure, postnasal drip that is green and brown, foul-smelling.  Patient also complaining of fever, chills, bodyaches, back pain muscle aches for the last 6 days.  Patient has taken multiple over-the-counter cough cold medications with no improvement in his symptoms.  Patient has a history of pneumonia a few years ago, is concerned that if he waited to come in it he would develop pneumonia.  Patient is also requesting a note for work.  No other complaints.    Sinus Problem  This is a new problem. The current episode started in the past 7 days. The problem has been gradually worsening since onset. The maximum temperature recorded prior to his arrival was 100.4 - 100.9 F. The fever has been present for 1 to 2 days. The pain is moderate. Associated symptoms include congestion, coughing, ear pain, headaches, shortness of breath, sinus pressure and a sore throat. Past treatments include acetaminophen and oral decongestants (cough medicine). The treatment provided no relief.       Review of Systems   Constitutional:  Positive for fever and malaise/fatigue.   HENT:  Positive for congestion, ear pain, sinus pressure, sinus pain and sore throat.    Respiratory:  Positive for cough, sputum production, shortness of breath and wheezing.    Cardiovascular:  Negative for chest pain.   Neurological:  Positive for headaches.   All other systems reviewed and are negative.             Objective     /60   Pulse 70   Temp 36.9 °C (98.4 °F)   Resp 16   Ht 1.702 m (5' 7\")   Wt 63.5 kg (140 lb)   SpO2 97%   BMI 21.93 kg/m²      Physical Exam  Vitals and " nursing note reviewed.   Constitutional:       General: He is not in acute distress.     Appearance: Normal appearance. He is well-developed. He is ill-appearing. He is not toxic-appearing or diaphoretic.   HENT:      Head: Normocephalic and atraumatic.      Right Ear: Tympanic membrane normal.      Left Ear: Tympanic membrane normal.      Nose: Congestion and rhinorrhea present.      Right Sinus: Maxillary sinus tenderness present.      Left Sinus: Maxillary sinus tenderness present.      Mouth/Throat:      Lips: Pink.      Mouth: Mucous membranes are moist.      Pharynx: Uvula midline. No oropharyngeal exudate or posterior oropharyngeal erythema.      Comments: Purulent postnasal drip visible in posterior oropharynx.  Eyes:      Extraocular Movements: Extraocular movements intact.      Conjunctiva/sclera: Conjunctivae normal.      Pupils: Pupils are equal, round, and reactive to light.   Cardiovascular:      Rate and Rhythm: Normal rate and regular rhythm.      Heart sounds: Normal heart sounds.   Pulmonary:      Effort: Pulmonary effort is normal.      Breath sounds: Wheezing present. No rhonchi or rales.   Chest:      Chest wall: No tenderness.   Abdominal:      Palpations: Abdomen is soft.   Musculoskeletal:         General: Normal range of motion.      Cervical back: Normal range of motion and neck supple.   Skin:     General: Skin is warm and dry.      Capillary Refill: Capillary refill takes less than 2 seconds.   Neurological:      General: No focal deficit present.      Mental Status: He is alert and oriented to person, place, and time.      Gait: Gait normal.   Psychiatric:         Mood and Affect: Mood normal.         Behavior: Behavior normal. Behavior is cooperative.                           Assessment & Plan             1. Acute sinusitis, recurrence not specified, unspecified location  amoxicillin-clavulanate (AUGMENTIN) 875-125 MG Tab    albuterol 108 (90 Base) MCG/ACT Aero Soln inhalation aerosol     promethazine-dextromethorphan (PROMETHAZINE-DM) 6.25-15 MG/5ML syrup    predniSONE (DELTASONE) 20 MG Tab      2. Mild intermittent asthma with acute exacerbation  amoxicillin-clavulanate (AUGMENTIN) 875-125 MG Tab    albuterol 108 (90 Base) MCG/ACT Aero Soln inhalation aerosol    promethazine-dextromethorphan (PROMETHAZINE-DM) 6.25-15 MG/5ML syrup    predniSONE (DELTASONE) 20 MG Tab      3. Cough, unspecified type  amoxicillin-clavulanate (AUGMENTIN) 875-125 MG Tab    albuterol 108 (90 Base) MCG/ACT Aero Soln inhalation aerosol    promethazine-dextromethorphan (PROMETHAZINE-DM) 6.25-15 MG/5ML syrup    predniSONE (DELTASONE) 20 MG Tab      4. Acute bronchospasm  amoxicillin-clavulanate (AUGMENTIN) 875-125 MG Tab    albuterol 108 (90 Base) MCG/ACT Aero Soln inhalation aerosol    promethazine-dextromethorphan (PROMETHAZINE-DM) 6.25-15 MG/5ML syrup    predniSONE (DELTASONE) 20 MG Tab      5. Generalized body aches  amoxicillin-clavulanate (AUGMENTIN) 875-125 MG Tab    albuterol 108 (90 Base) MCG/ACT Aero Soln inhalation aerosol    promethazine-dextromethorphan (PROMETHAZINE-DM) 6.25-15 MG/5ML syrup    predniSONE (DELTASONE) 20 MG Tab        Patient HPI and physical exam consistent with acute asthma exacerbation, wheezy cough, maxillary sinusitis with postnasal drip.  I will treat with Augmentin twice daily x 7 days, albuterol inhaler every 4 hours as needed for wheezing, prednisone twice daily x 5 days and promethazine cough syrup.    Patient can continue over-the-counter Tylenol Motrin as needed for body aches.    Differential diagnosis, supportive care, and indications for immediate follow-up discussed with patient.  Instructed to return to clinic or nearest emergency department for any change in condition, further concerns, or worsening of symptoms.    I personally reviewed prior external notes and test results pertinent to today's visit.  I have independently reviewed and interpreted all diagnostics ordered  during this urgent care visit.    PT should follow up with PCP in 1-2 days for re-evaluation if symptoms have not improved.      Discussed red flags and reasons to return to UC or ED.      Pt and/or family verbalized understanding of diagnosis and follow up instructions and was offered informational handout on diagnosis.  PT discharged.     Please note that this dictation was created using voice recognition software. I have made every reasonable attempt to correct obvious errors, but I expect that there may be errors of grammar and possibly content that I did not discover before finalizing the note.

## 2024-03-07 NOTE — LETTER
March 7, 2024         Patient: Kavon Ayala   YOB: 1985   Date of Visit: 3/7/2024           To Whom it May Concern:    Kavon Ayala was seen in my clinic on 3/7/2024. He may return to work on 3/11/2024.    If you have any questions or concerns, please don't hesitate to call.        Sincerely,           Maya Monroe P.A.-C.  Electronically Signed